# Patient Record
Sex: FEMALE
[De-identification: names, ages, dates, MRNs, and addresses within clinical notes are randomized per-mention and may not be internally consistent; named-entity substitution may affect disease eponyms.]

---

## 2017-10-06 ENCOUNTER — HOSPITAL ENCOUNTER (EMERGENCY)
Dept: HOSPITAL 31 - C.ER | Age: 42
LOS: 1 days | Discharge: HOME | End: 2017-10-07
Payer: COMMERCIAL

## 2017-10-06 VITALS — RESPIRATION RATE: 20 BRPM | TEMPERATURE: 98.9 F | OXYGEN SATURATION: 99 %
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
## 2017-10-06 DIAGNOSIS — S89.91XA: Primary | ICD-10-CM
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
## 2017-10-07 VITALS — HEART RATE: 78 BPM | SYSTOLIC BLOOD PRESSURE: 134 MMHG | DIASTOLIC BLOOD PRESSURE: 88 MMHG

## 2017-10-07 NOTE — RAD
PROCEDURE:  Right Knee Radiographs.



HISTORY:

lat patellar pain s/p injury



COMPARISON:

None.



FINDINGS:



BONES:

Normal. No fracture. 



JOINTS:

Normal. No osteoarthritis. 



JOINT EFFUSION:

None. 



OTHER FINDINGS:

None.



IMPRESSION:

Normal radiographs of the right knee.

## 2018-04-17 ENCOUNTER — HOSPITAL ENCOUNTER (OUTPATIENT)
Dept: HOSPITAL 31 - C.ENDO | Age: 43
Discharge: HOME | End: 2018-04-17
Attending: INTERNAL MEDICINE
Payer: COMMERCIAL

## 2018-04-17 VITALS — TEMPERATURE: 98.6 F

## 2018-04-17 VITALS
RESPIRATION RATE: 19 BRPM | SYSTOLIC BLOOD PRESSURE: 105 MMHG | OXYGEN SATURATION: 99 % | DIASTOLIC BLOOD PRESSURE: 73 MMHG | HEART RATE: 78 BPM

## 2018-04-17 VITALS — BODY MASS INDEX: 26.4 KG/M2

## 2018-04-17 DIAGNOSIS — I25.9: ICD-10-CM

## 2018-04-17 DIAGNOSIS — Z85.850: ICD-10-CM

## 2018-04-17 DIAGNOSIS — K44.9: ICD-10-CM

## 2018-04-17 DIAGNOSIS — K29.70: ICD-10-CM

## 2018-04-17 DIAGNOSIS — K21.0: Primary | ICD-10-CM

## 2018-04-17 PROCEDURE — 88305 TISSUE EXAM BY PATHOLOGIST: CPT

## 2018-04-17 PROCEDURE — 84703 CHORIONIC GONADOTROPIN ASSAY: CPT

## 2018-04-17 PROCEDURE — 43239 EGD BIOPSY SINGLE/MULTIPLE: CPT

## 2018-04-17 NOTE — CP.SDSHP
Same Day Surgery [facility]





- History


Proposed Procedure: egd


Pre-Op Diagnosis: epigastric pain.  heartburn in spite of PPI





- Previous Medical/Surgical History


Endocrine/Metabolic: Thyroid Disease, Other (Thyroid cancer )


Previous Surgical History: Thyroidectomy.   x 1





- Allergies


Allergies: 


Allergies





No Known Allergies Allergy (Verified 18 08:19)


 











- Physical Exam


General Appearance: Well


Vital Signs: 


 Vital Signs











  18





  08:40


 


Temperature 97.8 F


 


Pulse Rate 74


 


Respiratory 20





Rate 


 


Blood Pressure 107/72


 


O2 Sat by Pulse 100





Oximetry 











Mental Status: Alert & Oriented x3


Neuro: WNL


Heart: WNL


Lungs: WNL


GI: WNL





- Impression


Impression: epigastric pain.  heartburn in spite of long term PPI


Pt. Evaluated Today:Candidate for Anesthesia & Procedure: Yes





- Date & Time


Date: 18


Time: 10:23





Short Stay Discharge





- Short Stay Discharge


Admitting Diagnosis/Reason for Visit: EPIGASTRIC PAIN


Disposition: HOME/ ROUTINE